# Patient Record
Sex: MALE | Race: WHITE | NOT HISPANIC OR LATINO | Employment: FULL TIME | URBAN - NONMETROPOLITAN AREA
[De-identification: names, ages, dates, MRNs, and addresses within clinical notes are randomized per-mention and may not be internally consistent; named-entity substitution may affect disease eponyms.]

---

## 2018-01-10 ENCOUNTER — TELEPHONE (OUTPATIENT)
Dept: FAMILY MEDICINE CLINIC | Facility: CLINIC | Age: 23
End: 2018-01-10

## 2018-01-18 ENCOUNTER — TELEPHONE (OUTPATIENT)
Dept: FAMILY MEDICINE CLINIC | Facility: CLINIC | Age: 23
End: 2018-01-18

## 2018-01-18 ENCOUNTER — APPOINTMENT (OUTPATIENT)
Dept: LAB | Facility: HOSPITAL | Age: 23
End: 2018-01-18

## 2018-01-18 ENCOUNTER — OFFICE VISIT (OUTPATIENT)
Dept: FAMILY MEDICINE CLINIC | Facility: CLINIC | Age: 23
End: 2018-01-18

## 2018-01-18 VITALS
BODY MASS INDEX: 31.55 KG/M2 | DIASTOLIC BLOOD PRESSURE: 80 MMHG | SYSTOLIC BLOOD PRESSURE: 120 MMHG | WEIGHT: 213 LBS | HEIGHT: 69 IN

## 2018-01-18 DIAGNOSIS — F41.9 ANXIETY: ICD-10-CM

## 2018-01-18 DIAGNOSIS — F32.89 OTHER DEPRESSION: Primary | ICD-10-CM

## 2018-01-18 DIAGNOSIS — Z30.09 STERILIZATION CONSULT: ICD-10-CM

## 2018-01-18 PROBLEM — F32.A DEPRESSION: Status: ACTIVE | Noted: 2018-01-18

## 2018-01-18 LAB
25(OH)D3 SERPL-MCNC: 24.3 NG/ML (ref 30–100)
ALBUMIN SERPL-MCNC: 5 G/DL (ref 3.4–4.8)
ALBUMIN/GLOB SERPL: 1.3 G/DL (ref 1.1–1.8)
ALP SERPL-CCNC: 88 U/L (ref 38–126)
ALT SERPL W P-5'-P-CCNC: 41 U/L (ref 21–72)
ANION GAP SERPL CALCULATED.3IONS-SCNC: 14 MMOL/L (ref 5–15)
AST SERPL-CCNC: 66 U/L (ref 17–59)
BASOPHILS # BLD AUTO: 0.06 10*3/MM3 (ref 0–0.2)
BASOPHILS NFR BLD AUTO: 0.6 % (ref 0–2)
BILIRUB SERPL-MCNC: 0.7 MG/DL (ref 0.2–1.3)
BUN BLD-MCNC: 18 MG/DL (ref 7–21)
BUN/CREAT SERPL: 20.2 (ref 7–25)
CALCIUM SPEC-SCNC: 10.2 MG/DL (ref 8.4–10.2)
CHLORIDE SERPL-SCNC: 98 MMOL/L (ref 95–110)
CO2 SERPL-SCNC: 26 MMOL/L (ref 22–31)
CREAT BLD-MCNC: 0.89 MG/DL (ref 0.7–1.3)
DEPRECATED RDW RBC AUTO: 40.7 FL (ref 35.1–43.9)
EOSINOPHIL # BLD AUTO: 0.5 10*3/MM3 (ref 0–0.7)
EOSINOPHIL NFR BLD AUTO: 5.2 % (ref 0–7)
ERYTHROCYTE [DISTWIDTH] IN BLOOD BY AUTOMATED COUNT: 13.2 % (ref 11.5–14.5)
GFR SERPL CREATININE-BSD FRML MDRD: 105 ML/MIN/1.73 (ref 77–179)
GLOBULIN UR ELPH-MCNC: 3.8 GM/DL (ref 2.3–3.5)
GLUCOSE BLD-MCNC: 90 MG/DL (ref 60–100)
HCT VFR BLD AUTO: 44.8 % (ref 39–49)
HGB BLD-MCNC: 15.2 G/DL (ref 13.7–17.3)
IMM GRANULOCYTES # BLD: 0.02 10*3/MM3 (ref 0–0.02)
IMM GRANULOCYTES NFR BLD: 0.2 % (ref 0–0.5)
LYMPHOCYTES # BLD AUTO: 3.28 10*3/MM3 (ref 0.6–4.2)
LYMPHOCYTES NFR BLD AUTO: 34.3 % (ref 10–50)
MCH RBC QN AUTO: 28.8 PG (ref 26.5–34)
MCHC RBC AUTO-ENTMCNC: 33.9 G/DL (ref 31.5–36.3)
MCV RBC AUTO: 84.8 FL (ref 80–98)
MONOCYTES # BLD AUTO: 0.97 10*3/MM3 (ref 0–0.9)
MONOCYTES NFR BLD AUTO: 10.1 % (ref 0–12)
NEUTROPHILS # BLD AUTO: 4.73 10*3/MM3 (ref 2–8.6)
NEUTROPHILS NFR BLD AUTO: 49.6 % (ref 37–80)
PLATELET # BLD AUTO: 316 10*3/MM3 (ref 150–450)
PMV BLD AUTO: 11 FL (ref 8–12)
POTASSIUM BLD-SCNC: 3.7 MMOL/L (ref 3.5–5.1)
PROT SERPL-MCNC: 8.8 G/DL (ref 6.3–8.6)
RBC # BLD AUTO: 5.28 10*6/MM3 (ref 4.37–5.74)
SODIUM BLD-SCNC: 138 MMOL/L (ref 137–145)
TSH SERPL DL<=0.05 MIU/L-ACNC: 2.87 MIU/ML (ref 0.46–4.68)
VIT B12 BLD-MCNC: 698 PG/ML (ref 239–931)
WBC NRBC COR # BLD: 9.56 10*3/MM3 (ref 3.2–9.8)

## 2018-01-18 PROCEDURE — 99213 OFFICE O/P EST LOW 20 MIN: CPT | Performed by: NURSE PRACTITIONER

## 2018-01-18 PROCEDURE — 85025 COMPLETE CBC W/AUTO DIFF WBC: CPT | Performed by: NURSE PRACTITIONER

## 2018-01-18 PROCEDURE — 80053 COMPREHEN METABOLIC PANEL: CPT | Performed by: NURSE PRACTITIONER

## 2018-01-18 PROCEDURE — 36415 COLL VENOUS BLD VENIPUNCTURE: CPT | Performed by: NURSE PRACTITIONER

## 2018-01-18 PROCEDURE — 82306 VITAMIN D 25 HYDROXY: CPT | Performed by: NURSE PRACTITIONER

## 2018-01-18 PROCEDURE — 82607 VITAMIN B-12: CPT | Performed by: NURSE PRACTITIONER

## 2018-01-18 PROCEDURE — 84443 ASSAY THYROID STIM HORMONE: CPT | Performed by: NURSE PRACTITIONER

## 2018-01-18 RX ORDER — BUSPIRONE HYDROCHLORIDE 10 MG/1
10 TABLET ORAL 3 TIMES DAILY
Qty: 90 TABLET | Refills: 3 | Status: SHIPPED | OUTPATIENT
Start: 2018-01-18 | End: 2018-03-02

## 2018-01-18 RX ORDER — ERGOCALCIFEROL 1.25 MG/1
50000 CAPSULE ORAL WEEKLY
Qty: 4 CAPSULE | Refills: 5 | Status: SHIPPED | OUTPATIENT
Start: 2018-01-18 | End: 2018-04-27

## 2018-01-18 NOTE — PROGRESS NOTES
Chief Complaint   Patient presents with   • Establish Care     having some anger issues    • Anxiety     Subjective   Stef Kiran is a 22 y.o. male.     Anxiety   Presents for follow-up visit. Symptoms include chest pain, excessive worry, insomnia, irritability, nervous/anxious behavior and panic. Patient reports no compulsions, confusion, decreased concentration, depressed mood, dizziness, dry mouth, feeling of choking, hyperventilation, impotence, malaise, muscle tension, nausea, obsessions, palpitations, restlessness, shortness of breath or suicidal ideas. Symptoms occur most days. The severity of symptoms is moderate. The quality of sleep is good. Nighttime awakenings: none.     Compliance with medications is %.        The following portions of the patient's history were reviewed and updated as appropriate: allergies, current medications, past social   and problem list.    Review of Systems   Constitutional: Positive for irritability.   HENT: Negative.  Negative for congestion and dental problem.    Eyes: Negative.  Negative for discharge.   Respiratory: Negative.  Negative for shortness of breath, wheezing and stridor.    Cardiovascular: Positive for chest pain. Negative for palpitations.   Gastrointestinal: Negative.  Negative for nausea.   Endocrine: Negative.  Negative for cold intolerance, heat intolerance, polydipsia, polyphagia and polyuria.   Genitourinary: Negative.  Negative for impotence.        Requesting sterilization due to having 4 children    Musculoskeletal: Negative.  Negative for arthralgias, back pain and gait problem.   Skin: Negative.    Allergic/Immunologic: Negative.  Negative for environmental allergies, food allergies and immunocompromised state.   Neurological: Negative.  Negative for dizziness.   Hematological: Negative.  Negative for adenopathy. Does not bruise/bleed easily.   Psychiatric/Behavioral: Positive for agitation and sleep disturbance. Negative for behavioral  "problems, confusion, decreased concentration, dysphoric mood, hallucinations and suicidal ideas. The patient is nervous/anxious and has insomnia. The patient is not hyperactive.         Stress anxiety -hx of same-self medicated in the past        Objective   /80  Ht 175.3 cm (69\")  Wt 96.6 kg (213 lb)  BMI 31.45 kg/m2  Physical Exam   Constitutional: He is oriented to person, place, and time. He appears well-developed and well-nourished. No distress.   HENT:   Head: Normocephalic and atraumatic.   Mouth/Throat: No oropharyngeal exudate.   Eyes: EOM are normal. Pupils are equal, round, and reactive to light. Right eye exhibits no discharge. Left eye exhibits no discharge. No scleral icterus.   Neck: Normal range of motion. Neck supple. No JVD present. No tracheal deviation present. No thyromegaly present.   Cardiovascular: Normal rate, regular rhythm and normal heart sounds.  Exam reveals no gallop and no friction rub.    No murmur heard.  Pulmonary/Chest: Effort normal and breath sounds normal. No stridor. No respiratory distress. He has no wheezes. He has no rales. He exhibits no tenderness.   Abdominal: Soft. Bowel sounds are normal. He exhibits no distension and no mass. There is no tenderness. There is no rebound and no guarding. No hernia.   Genitourinary: Rectum normal and penis normal.   Musculoskeletal: Normal range of motion. He exhibits no edema, tenderness or deformity.   Lymphadenopathy:     He has no cervical adenopathy.   Neurological: He is alert and oriented to person, place, and time. He has normal reflexes. He displays normal reflexes. No cranial nerve deficit. He exhibits normal muscle tone. Coordination normal.   Skin: Skin is warm. No rash noted. He is not diaphoretic. No erythema. No pallor.   Psychiatric: He has a normal mood and affect.   Nursing note and vitals reviewed.      Assessment/Plan   Problem List Items Addressed This Visit        Other    Anxiety    Relevant Orders    CBC " & Differential    Comprehensive Metabolic Panel    TSH    Vitamin D 25 Hydroxy    Vitamin B12    CBC Auto Differential    Depression - Primary    Relevant Medications    busPIRone (BUSPAR) 10 MG tablet    sertraline (ZOLOFT) 50 MG tablet    Other Relevant Orders    CBC & Differential    Comprehensive Metabolic Panel    TSH    Vitamin D 25 Hydroxy    Vitamin B12    CBC Auto Differential    Sterilization consult    Relevant Orders    Ambulatory Referral to Urology (Completed)    CBC & Differential    Comprehensive Metabolic Panel    TSH    Vitamin D 25 Hydroxy    Vitamin B12    CBC Auto Differential           New Medications Ordered This Visit   Medications   • busPIRone (BUSPAR) 10 MG tablet     Sig: Take 1 tablet by mouth 3 (Three) Times a Day.     Dispense:  90 tablet     Refill:  3   • sertraline (ZOLOFT) 50 MG tablet     Sig: Take 1 tablet by mouth Daily.     Dispense:  30 tablet     Refill:  11        Add buspar, zoloft as directed meds as directed-if symptoms worsen return     It's not just what you eat, but when you eat  Eat breakfast, and eat smaller meals throughout the day. A healthy breakfast can jumpstart your metabolism, while eating small, healthy meals (rather than the standard three large meals) keeps your energy up.   Avoid eating at night. Try to eat dinner earlier and fast for 14-16 hours until breakfast the next morning. Studies suggest that eating only when you’re most active and giving your digestive system a long break each day may help to regulate weight.

## 2018-01-18 NOTE — PROGRESS NOTES
TAMMY Hess, Mr. Kiran has been called with his recent lab results & recommendations.  Continue his current medications and follow-up as planned or sooner if any problems.

## 2018-01-18 NOTE — TELEPHONE ENCOUNTER
TAMMY Hess, Mr. Kiran has been called with his recent lab results & recommendations.  Continue his current medications and follow-up as planned or sooner if any problems.      ----- Message from TAMMY Dobbins sent at 1/18/2018  3:32 PM CST -----  Can you let him know his vitamin d is low needs vitamin d [id number] weekly otherwise his labs are good

## 2018-03-02 ENCOUNTER — OFFICE VISIT (OUTPATIENT)
Dept: FAMILY MEDICINE CLINIC | Facility: CLINIC | Age: 23
End: 2018-03-02

## 2018-03-02 VITALS
BODY MASS INDEX: 31.84 KG/M2 | WEIGHT: 215 LBS | SYSTOLIC BLOOD PRESSURE: 110 MMHG | HEIGHT: 69 IN | DIASTOLIC BLOOD PRESSURE: 72 MMHG

## 2018-03-02 DIAGNOSIS — F41.9 ANXIETY: Primary | ICD-10-CM

## 2018-03-02 DIAGNOSIS — F32.89 OTHER DEPRESSION: ICD-10-CM

## 2018-03-02 PROCEDURE — 99213 OFFICE O/P EST LOW 20 MIN: CPT | Performed by: NURSE PRACTITIONER

## 2018-03-02 RX ORDER — BUSPIRONE HYDROCHLORIDE 30 MG/1
TABLET ORAL
Qty: 80 TABLET | Refills: 11 | Status: SHIPPED | OUTPATIENT
Start: 2018-03-02

## 2018-03-02 NOTE — PROGRESS NOTES
Chief Complaint   Patient presents with   • Follow-up   • Depression     change the dose     Subjective   Stef Kiran is a 23 y.o. male.     Anxiety   Presents for follow-up visit. Symptoms include chest pain, excessive worry, insomnia, irritability, nervous/anxious behavior and panic. Patient reports no compulsions, confusion, decreased concentration, depressed mood, dizziness, dry mouth, feeling of choking, hyperventilation, impotence, malaise, muscle tension, nausea, obsessions, palpitations, restlessness, shortness of breath or suicidal ideas. Symptoms occur most days. The severity of symptoms is moderate. The quality of sleep is good. Nighttime awakenings: none.     Compliance with medications is %.        The following portions of the patient's history were reviewed and updated as appropriate: allergies, current medications, past social history and problem list.    Review of Systems   Constitutional: Positive for irritability.   HENT: Negative.  Negative for congestion and dental problem.    Eyes: Negative.  Negative for discharge.   Respiratory: Negative.  Negative for shortness of breath, wheezing and stridor.    Cardiovascular: Positive for chest pain. Negative for palpitations.   Gastrointestinal: Negative.  Negative for nausea.   Endocrine: Negative.  Negative for cold intolerance, heat intolerance, polydipsia, polyphagia and polyuria.   Genitourinary: Negative.  Negative for impotence.        Requesting sterilization due to having 4 children    Musculoskeletal: Negative.  Negative for arthralgias, back pain and gait problem.   Skin: Negative.    Allergic/Immunologic: Negative.  Negative for environmental allergies, food allergies and immunocompromised state.   Neurological: Negative.  Negative for dizziness.   Hematological: Negative.  Negative for adenopathy. Does not bruise/bleed easily.   Psychiatric/Behavioral: Positive for agitation and sleep disturbance. Negative for behavioral problems,  "confusion, decreased concentration, dysphoric mood, hallucinations and suicidal ideas. The patient is nervous/anxious and has insomnia. The patient is not hyperactive.         Stress anxiety -hx of same-self medicated in the past -meds helping but wants to increasing due to family stressors and anxiety        Objective   /72  Ht 175.3 cm (69\")  Wt 97.5 kg (215 lb)  BMI 31.75 kg/m2  Physical Exam   Constitutional: He is oriented to person, place, and time. He appears well-developed and well-nourished. No distress.   HENT:   Head: Normocephalic and atraumatic.   Mouth/Throat: No oropharyngeal exudate.   Eyes: EOM are normal. Pupils are equal, round, and reactive to light. Right eye exhibits no discharge. Left eye exhibits no discharge. No scleral icterus.   Neck: Normal range of motion. Neck supple. No JVD present. No tracheal deviation present. No thyromegaly present.   Cardiovascular: Normal rate, regular rhythm and normal heart sounds.  Exam reveals no gallop and no friction rub.    No murmur heard.  Pulmonary/Chest: Effort normal and breath sounds normal. No stridor. No respiratory distress. He has no wheezes. He has no rales. He exhibits no tenderness.   Abdominal: Soft. Bowel sounds are normal. He exhibits no distension and no mass. There is no tenderness. There is no rebound and no guarding. No hernia.   Genitourinary: Rectum normal and penis normal.   Musculoskeletal: Normal range of motion. He exhibits no edema, tenderness or deformity.   Lymphadenopathy:     He has no cervical adenopathy.   Neurological: He is alert and oriented to person, place, and time. He has normal reflexes. He displays normal reflexes. No cranial nerve deficit. He exhibits normal muscle tone. Coordination normal.   Skin: Skin is warm and dry. No rash noted. He is not diaphoretic. No erythema. No pallor.   Psychiatric: He has a normal mood and affect.   Nursing note and vitals reviewed.      Assessment/Plan   Problem List Items " Addressed This Visit        Other    Anxiety - Primary    Depression    Relevant Medications    busPIRone (BUSPAR) 30 MG tablet           New Medications Ordered This Visit   Medications   • busPIRone (BUSPAR) 30 MG tablet     Sig: Use bid and as needed-up to three times a day     Dispense:  80 tablet     Refill:  11       Stress relief discussed in length. Consider therapy, suggest yoga, exercise, meditation -patient is agrees-will call back if worsen for referral

## 2018-03-22 ENCOUNTER — OFFICE VISIT (OUTPATIENT)
Dept: FAMILY MEDICINE CLINIC | Facility: CLINIC | Age: 23
End: 2018-03-22

## 2018-03-22 VITALS
SYSTOLIC BLOOD PRESSURE: 110 MMHG | WEIGHT: 224 LBS | DIASTOLIC BLOOD PRESSURE: 80 MMHG | BODY MASS INDEX: 33.18 KG/M2 | HEIGHT: 69 IN

## 2018-03-22 DIAGNOSIS — Z30.09 ENCOUNTER FOR VASECTOMY ASSESSMENT: ICD-10-CM

## 2018-03-22 DIAGNOSIS — R10.2 PELVIC PAIN: Primary | ICD-10-CM

## 2018-03-22 PROCEDURE — 99213 OFFICE O/P EST LOW 20 MIN: CPT | Performed by: NURSE PRACTITIONER

## 2018-03-22 RX ORDER — DOXYCYCLINE 100 MG/1
100 CAPSULE ORAL 2 TIMES DAILY
Qty: 14 CAPSULE | Refills: 0 | Status: SHIPPED | OUTPATIENT
Start: 2018-03-22 | End: 2018-04-12

## 2018-03-22 RX ORDER — NAPROXEN 500 MG/1
500 TABLET ORAL 2 TIMES DAILY WITH MEALS
Qty: 30 TABLET | Refills: 1 | Status: SHIPPED | OUTPATIENT
Start: 2018-03-22 | End: 2018-04-27

## 2018-03-22 RX ORDER — ACETAMINOPHEN AND CODEINE PHOSPHATE 300; 30 MG/1; MG/1
1 TABLET ORAL EVERY 4 HOURS PRN
Qty: 30 TABLET | Refills: 0 | Status: SHIPPED | OUTPATIENT
Start: 2018-03-22 | End: 2018-04-12

## 2018-03-22 NOTE — PROGRESS NOTES
"  Chief Complaint   Patient presents with   • Follow-up     after surgery ( vasectomy ) Friday 16   • Abdominal Pain     since having the pocedure that did not get finished     Subjective   Stef Kiran is a 23 y.o. male.     Attempted to have a vasectomy -felt pain and did not complete procedure -is scheduled for inpatient vasectomy July 2018       Abdominal Pain   This is a new problem. The onset quality is sudden. The problem occurs intermittently. The problem has been gradually worsening. The pain is at a severity of 10/10. The pain is severe. The quality of the pain is sharp. Pain radiation: pelvic  Pertinent negatives include no anorexia, arthralgias, belching, constipation, diarrhea, dysuria, fever, flatus, frequency, headaches, hematochezia, hematuria, melena, myalgias, nausea, vomiting or weight loss. Exacerbated by: doxycycline         The following portions of the patient's history were reviewed and updated as appropriate: allergies, current medications, past social history and problem list.    Review of Systems   Constitutional: Negative.  Negative for fever and weight loss.   HENT: Negative.    Eyes: Negative.    Respiratory: Negative.    Cardiovascular: Negative.    Gastrointestinal: Positive for abdominal pain. Negative for anorexia, constipation, diarrhea, flatus, hematochezia, melena, nausea and vomiting.   Endocrine: Negative.    Genitourinary: Positive for genital sores, scrotal swelling and testicular pain. Negative for decreased urine volume, discharge, dysuria, enuresis, flank pain, frequency, hematuria, penile pain, penile swelling and urgency.   Musculoskeletal: Negative.  Negative for arthralgias and myalgias.   Skin: Negative.    Allergic/Immunologic: Negative.    Neurological: Negative.  Negative for headaches.   Hematological: Negative.    Psychiatric/Behavioral: Negative.    All other systems reviewed and are negative.      Objective   /80   Ht 175.3 cm (69\")   Wt 102 kg (224 " lb)   BMI 33.08 kg/m²   Physical Exam   Constitutional: He is oriented to person, place, and time. He appears well-developed and well-nourished.   HENT:   Head: Normocephalic and atraumatic.   Eyes: EOM are normal. Pupils are equal, round, and reactive to light.   Neck: Normal range of motion. Neck supple.   Cardiovascular: Normal rate.    Pulmonary/Chest: Effort normal and breath sounds normal.   Abdominal: Soft. Bowel sounds are normal.   Genitourinary:   Genitourinary Comments: Swelling right testicle from previous intervention -stitch present    Musculoskeletal: Normal range of motion.   Neurological: He is alert and oriented to person, place, and time.   Skin: Skin is warm and dry.   Right testicle tender and swollen    Nursing note and vitals reviewed.      Assessment/Plan   Problem List Items Addressed This Visit        Nervous and Auditory    Pelvic pain - Primary       Other    Encounter for vasectomy assessment    Relevant Orders    Ambulatory Referral to Urology (Completed)      Other Visit Diagnoses    None.          New Medications Ordered This Visit   Medications   • doxycycline (MONODOX) 100 MG capsule     Sig: Take 1 capsule by mouth 2 (Two) Times a Day.     Dispense:  14 capsule     Refill:  0   • naproxen (NAPROSYN) 500 MG tablet     Sig: Take 1 tablet by mouth 2 (Two) Times a Day With Meals.     Dispense:  30 tablet     Refill:  1   • acetaminophen-codeine (TYLENOL #3) 300-30 MG per tablet     Sig: Take 1 tablet by mouth Every 4 (Four) Hours As Needed for Moderate Pain .     Dispense:  30 tablet     Refill:  0      warm compresses to area and see if helps, meds as directed, return if worsen     Patient understands the risks associated with this controlled medication, including tolerance and addiction.  he also agrees to only obtain this medication from me, and not from a another provider, unless that provider is covering for me in my absence.  he also agrees to be compliant in dosing, and not  self adjust the dose of medication.  A signed controlled substance agreement is on file, and he has received a controlled substance education sheet at this a previous visit.  he has also signed a consent for treatment with a controlled substance as per [facility] policy. MAXIMO was obtained.

## 2018-03-27 ENCOUNTER — PREP FOR SURGERY (OUTPATIENT)
Dept: OTHER | Facility: HOSPITAL | Age: 23
End: 2018-03-27

## 2018-03-27 DIAGNOSIS — Z30.09 UNWANTED FERTILITY: Primary | ICD-10-CM

## 2018-03-27 RX ORDER — CEFTRIAXONE 1 G/1
1 INJECTION, POWDER, FOR SOLUTION INTRAMUSCULAR; INTRAVENOUS ONCE
Status: CANCELLED | OUTPATIENT
Start: 2018-04-17 | End: 2018-04-17

## 2018-03-28 PROBLEM — Z30.09 UNWANTED FERTILITY: Status: ACTIVE | Noted: 2018-03-28

## 2018-04-02 NOTE — H&P
[facility] History and Physical  FARHAT LOPEZ  23-year-old; :1995;;male  [id number];[place] [id number]  Ins: 1) [employer]  Employer: [employer]  MRN:[id number]  CIERA CLANCY MD  [facility]  2018 11:31 AM  Allergies  No Known Allergies Unknown  Current Medications  Takes no meds  * Medications Reconciled  Surgical History  NONE NOTED  Social History  Pt currently uses one can of dip a day. Does not  drink.   Imm/Inj/Office Meds History Comments  Patient declines influenza vaccination.  Chief Complaint  [place] Male  History of Present Illness  Patient is a  male here today for a vasectomy consultation. He has 4 children, two daughters ages 5 years and 3 years and two sons ages 8 years and 2 years.  Location: Vas deferens  Modifying factors: Birth control of none is used at present time.  Associated signs and symptoms: None.  Review of Systems  Eyes: ; Denies: blurry vision, pain in the eyes and double vision  ENMT: ; Denies: ear pain, sore throat and sinus problems  Cardiovascular: ; Denies: chest pain, varicose veins, angina and syncope  Respiratory: ; Denies: shortness of breath, wheezing and frequent cough  Gastrointestinal: ; Denies: abdominal pain, nausea, vomiting, indigestion and  heartburn  Genitourinary: ; Denies: other symptoms (see HPI)  Musculoskeletal: ; Denies: joint pain, neck pain and back pain  Integumentary: ; Denies: skin rash, boils and persistent itch  Neurological: ; Denies: tremors, dizzy spells and numbness / tingling  Psychiatric: Reports: generally satisfied with life and anxiety; Denies: depression  and suicidal ideation  Endocrine: ; Denies: Excessive thirst, cold intolerance, heat intolerance and  tired/sluggish  Hematologic/Lymphatic: ; Denies: swollen glands and blood clotting problem  Allergic/Immunologic: ; Denies: hayfever  Constitutional: ; Denies: fever, chills and weight loss  Vital  Signs  Heart Rate: 85 (/min) Weight: 215 (lb)  Resp Rate: 16 (/min) Height: 69 (in)  BMI: 31.75 Former smoker  BP: 134/98 (mmHg)  Exam  General appearance: The patient appears well developed and well nourished, in no apparent acute distress.  Examination of neck: Neck appears supple.  Assessment of respiratory effort: Respiratory effort appears normal. No apparent  distress.  Examination of gait and station: Normal gait and stature.  Head and neck (Assessment of range of motion): Adequate ROM noted in all  extremities.  Head and neck (Assessment of stability): Ambulates without assistance.  Inspection and/or palpation of skin and subcutaneous tissue: Exam of the skin is within normal limits.  Orientation: He appears alert and oriented.  Mood and affect: Mood and affect appear normal.  Data Review  Medications and chart reviewed. History and physical form/ROS reviewed and new  form completed today.  Assessment - Vasectomy requested  DX:  Vasectomy requested  SNO: [id number], ICD-9: Z30.2  Assessment Notes:  The patient and I had a long discussion in regard to vasectomy. The patient  understands he is not safe for unprotected intercourse prior to viewing two semen  specimens after the vasectomy showing no sperm. He understands that there is a possibility of recanalization of the vas deferens creating fertility. He should use ice, elevation, and inactivity for two days following the vasectomy; no intercourse for 2 weeks. He understands complication of: 1) swelling in the scrotal area and the testicles, 2) bleeding, possibly requiring hospitalization and surgical drainage, 3) loss of a testicle, 4) infection, and 5) pain. He understands this is meant to be a permanent procedure; however the results can not be guaranteed. His questions have been answered.  Consent has been signed.  Plan  Plan Notes:  Vasectomy.  Education:  Vasectomy - English  Vasectomy Consent - English  Discussion:  Literature and reference material  was given to the patient and discussed.  FINDINGS WERE DISCUSSED WITH PATIENT. RISKS AND BENEFITS  EXPLAINED. PATIENT WISHES TO PROCEED.  Instructions:  The patient is to schedule the procedure.

## 2018-04-12 ENCOUNTER — OFFICE VISIT (OUTPATIENT)
Dept: FAMILY MEDICINE CLINIC | Facility: CLINIC | Age: 23
End: 2018-04-12

## 2018-04-12 VITALS
WEIGHT: 227 LBS | SYSTOLIC BLOOD PRESSURE: 110 MMHG | BODY MASS INDEX: 33.62 KG/M2 | DIASTOLIC BLOOD PRESSURE: 70 MMHG | HEIGHT: 69 IN

## 2018-04-12 DIAGNOSIS — R10.2 PELVIC PAIN: ICD-10-CM

## 2018-04-12 DIAGNOSIS — F41.9 ANXIETY: Primary | ICD-10-CM

## 2018-04-12 PROCEDURE — 99213 OFFICE O/P EST LOW 20 MIN: CPT | Performed by: NURSE PRACTITIONER

## 2018-04-12 RX ORDER — SERTRALINE HYDROCHLORIDE 100 MG/1
100 TABLET, FILM COATED ORAL DAILY
Qty: 30 TABLET | Refills: 11 | Status: SHIPPED | OUTPATIENT
Start: 2018-04-12

## 2018-04-12 RX ORDER — IBUPROFEN 800 MG/1
800 TABLET ORAL EVERY 6 HOURS PRN
Qty: 60 TABLET | Refills: 5 | Status: SHIPPED | OUTPATIENT
Start: 2018-04-12 | End: 2018-04-27

## 2018-04-12 RX ORDER — GABAPENTIN 100 MG/1
100 CAPSULE ORAL 3 TIMES DAILY
Qty: 60 CAPSULE | Refills: 2 | Status: SHIPPED | OUTPATIENT
Start: 2018-04-12 | End: 2018-04-27

## 2018-04-12 NOTE — PROGRESS NOTES
Chief Complaint   Patient presents with   • Follow-up     medication   • Anxiety       Chief Complaint   Patient presents with   • Follow-up     medication   • Anxiety     Subjective   Stef Kiran is a 23 y.o. male.     Anxiety   Presents for follow-up visit. Symptoms include chest pain, excessive worry, insomnia, irritability, nervous/anxious behavior and panic. Patient reports no compulsions, confusion, decreased concentration, depressed mood, dizziness, dry mouth, feeling of choking, hyperventilation, impotence, malaise, muscle tension, nausea, obsessions, palpitations, restlessness, shortness of breath or suicidal ideas. Symptoms occur most days. The severity of symptoms is moderate. The quality of sleep is good. Nighttime awakenings: none.     Compliance with medications is %.        The following portions of the patient's history were reviewed and updated as appropriate: allergies, current medications, past social history and problem list.    Review of Systems   Constitutional: Positive for irritability.   HENT: Negative.  Negative for congestion and dental problem.    Eyes: Negative.  Negative for discharge.   Respiratory: Negative.  Negative for shortness of breath, wheezing and stridor.    Cardiovascular: Positive for chest pain. Negative for palpitations.   Gastrointestinal: Negative.  Negative for nausea.   Endocrine: Negative.  Negative for cold intolerance, heat intolerance, polydipsia, polyphagia and polyuria.   Genitourinary: Negative.  Negative for impotence.        Requesting sterilization due to having 4 children    Musculoskeletal: Negative.  Negative for arthralgias, back pain and gait problem.   Skin: Negative.    Allergic/Immunologic: Negative.  Negative for environmental allergies, food allergies and immunocompromised state.   Neurological: Negative.  Negative for dizziness.   Hematological: Negative.  Negative for adenopathy. Does not bruise/bleed easily.   Psychiatric/Behavioral:  "Positive for agitation and sleep disturbance. Negative for behavioral problems, confusion, decreased concentration, dysphoric mood, hallucinations and suicidal ideas. The patient is nervous/anxious and has insomnia. The patient is not hyperactive.         Stress anxiety -hx of same-self medicated in the past -meds helping but wants to increasing due to family stressors and anxiety        Objective   /70   Ht 175.3 cm (69\")   Wt 103 kg (227 lb)   BMI 33.52 kg/m²   Physical Exam   Constitutional: He is oriented to person, place, and time. He appears well-developed and well-nourished. No distress.   HENT:   Head: Normocephalic and atraumatic.   Mouth/Throat: No oropharyngeal exudate.   Eyes: EOM are normal. Pupils are equal, round, and reactive to light. Right eye exhibits no discharge. Left eye exhibits no discharge. No scleral icterus.   Neck: Normal range of motion. Neck supple. No JVD present. No tracheal deviation present. No thyromegaly present.   Cardiovascular: Normal rate, regular rhythm and normal heart sounds.  Exam reveals no gallop and no friction rub.    No murmur heard.  Pulmonary/Chest: Effort normal and breath sounds normal. No stridor. No respiratory distress. He has no wheezes. He has no rales. He exhibits no tenderness.   Abdominal: Soft. Bowel sounds are normal. He exhibits no distension and no mass. There is no tenderness. There is no rebound and no guarding. No hernia.   Genitourinary: Rectum normal and penis normal.   Musculoskeletal: Normal range of motion. He exhibits no edema, tenderness or deformity.   Lymphadenopathy:     He has no cervical adenopathy.   Neurological: He is alert and oriented to person, place, and time. He has normal reflexes. He displays normal reflexes. No cranial nerve deficit. He exhibits normal muscle tone. Coordination normal.   Skin: Skin is warm and dry. No rash noted. He is not diaphoretic. No erythema. No pallor.   Psychiatric: He has a normal mood and " "affect.   Nursing note and vitals reviewed.      Increase zoloft-neurontin prn pain and anxiety -add ibuprofen for pain-needs fmla for time off of work as directed     Patient has not been able to work this week due to pain-has called in but needs fmla to cover             Follow up with anxiety and recent procedure -attempted sterilization in office-is scheduled for in hospital procedure 04/18/2018 -having pelvic pain and pressure-not able to stand the pain during work-pain is intense     Pelvic Pain   This is a recurrent problem. The current episode started 1 to 4 weeks ago. The problem occurs constantly. The problem has been gradually worsening. Associated symptoms include abdominal pain. Pertinent negatives include no anorexia, arthralgias, chest pain, chills, congestion, coughing, diaphoresis, fever, myalgias, neck pain, numbness, rash, sore throat, swollen glands or urinary symptoms. The symptoms are aggravated by bending, exertion and standing. He has tried acetaminophen for the symptoms.        The following portions of the patient's history were reviewed and updated as appropriate: allergies, current medications, past social history and   Review of Systems   Constitutional: Negative for chills, diaphoresis and fever.   HENT: Negative for congestion and sore throat.    Respiratory: Negative for cough.    Cardiovascular: Negative for chest pain.   Gastrointestinal: Positive for abdominal pain. Negative for anorexia.   Genitourinary: Positive for genital sores and pelvic pain. Negative for discharge and hematuria.   Musculoskeletal: Negative for arthralgias, myalgias and neck pain.   Skin: Negative for rash.   Neurological: Negative for numbness.       Objective   /70   Ht 175.3 cm (69\")   Wt 103 kg (227 lb)   BMI 33.52 kg/m²   Physical Exam    Patient understands the risks associated with this controlled medication, including tolerance and addiction.  he also agrees to only obtain this medication " from me, and not from a another provider, unless that provider is covering for me in my absence.  he also agrees to be compliant in dosing, and not self adjust the dose of medication.  A signed controlled substance agreement is on file, and he has received a controlled substance education sheet at this a previous visit.  he has also signed a consent for treatment with a controlled substance as per [facility] policy. MAXIMO was obtained.

## 2018-04-13 ENCOUNTER — APPOINTMENT (OUTPATIENT)
Dept: PREADMISSION TESTING | Facility: HOSPITAL | Age: 23
End: 2018-04-13

## 2018-04-13 VITALS
OXYGEN SATURATION: 97 % | HEIGHT: 69 IN | BODY MASS INDEX: 33.18 KG/M2 | WEIGHT: 224 LBS | SYSTOLIC BLOOD PRESSURE: 120 MMHG | RESPIRATION RATE: 16 BRPM | HEART RATE: 70 BPM | DIASTOLIC BLOOD PRESSURE: 80 MMHG

## 2018-04-13 RX ORDER — SODIUM CHLORIDE, SODIUM GLUCONATE, SODIUM ACETATE, POTASSIUM CHLORIDE, AND MAGNESIUM CHLORIDE 526; 502; 368; 37; 30 MG/100ML; MG/100ML; MG/100ML; MG/100ML; MG/100ML
1000 INJECTION, SOLUTION INTRAVENOUS CONTINUOUS PRN
Status: CANCELLED | OUTPATIENT
Start: 2018-04-18

## 2018-04-18 ENCOUNTER — ANESTHESIA EVENT (OUTPATIENT)
Dept: PERIOP | Facility: HOSPITAL | Age: 23
End: 2018-04-18

## 2018-04-18 ENCOUNTER — ANESTHESIA (OUTPATIENT)
Dept: PERIOP | Facility: HOSPITAL | Age: 23
End: 2018-04-18

## 2018-04-18 ENCOUNTER — HOSPITAL ENCOUNTER (OUTPATIENT)
Facility: HOSPITAL | Age: 23
Setting detail: HOSPITAL OUTPATIENT SURGERY
Discharge: HOME | End: 2018-04-18
Attending: UROLOGY | Admitting: UROLOGY

## 2018-04-18 VITALS
HEART RATE: 76 BPM | DIASTOLIC BLOOD PRESSURE: 55 MMHG | WEIGHT: 222.66 LBS | RESPIRATION RATE: 18 BRPM | TEMPERATURE: 97 F | SYSTOLIC BLOOD PRESSURE: 103 MMHG | HEIGHT: 69 IN | OXYGEN SATURATION: 94 % | BODY MASS INDEX: 32.98 KG/M2

## 2018-04-18 DIAGNOSIS — Z30.09 UNWANTED FERTILITY: ICD-10-CM

## 2018-04-18 PROCEDURE — 25010000002 ONDANSETRON PER 1 MG: Performed by: NURSE ANESTHETIST, CERTIFIED REGISTERED

## 2018-04-18 PROCEDURE — 25010000002 DEXAMETHASONE PER 1 MG: Performed by: NURSE ANESTHETIST, CERTIFIED REGISTERED

## 2018-04-18 PROCEDURE — 25010000002 PROPOFOL 10 MG/ML EMULSION: Performed by: NURSE ANESTHETIST, CERTIFIED REGISTERED

## 2018-04-18 PROCEDURE — 88302 TISSUE EXAM BY PATHOLOGIST: CPT | Performed by: PATHOLOGY

## 2018-04-18 PROCEDURE — 25010000002 FENTANYL CITRATE (PF) 100 MCG/2ML SOLUTION: Performed by: NURSE ANESTHETIST, CERTIFIED REGISTERED

## 2018-04-18 PROCEDURE — 88302 TISSUE EXAM BY PATHOLOGIST: CPT | Performed by: UROLOGY

## 2018-04-18 PROCEDURE — 25010000002 MIDAZOLAM PER 1 MG: Performed by: NURSE ANESTHETIST, CERTIFIED REGISTERED

## 2018-04-18 RX ORDER — NALOXONE HCL 0.4 MG/ML
0.2 VIAL (ML) INJECTION AS NEEDED
Status: DISCONTINUED | OUTPATIENT
Start: 2018-04-18 | End: 2018-04-18 | Stop reason: HOSPADM

## 2018-04-18 RX ORDER — LIDOCAINE HYDROCHLORIDE 10 MG/ML
INJECTION, SOLUTION EPIDURAL; INFILTRATION; INTRACAUDAL; PERINEURAL AS NEEDED
Status: DISCONTINUED | OUTPATIENT
Start: 2018-04-18 | End: 2018-04-18 | Stop reason: HOSPADM

## 2018-04-18 RX ORDER — MIDAZOLAM HYDROCHLORIDE 1 MG/ML
INJECTION INTRAMUSCULAR; INTRAVENOUS AS NEEDED
Status: DISCONTINUED | OUTPATIENT
Start: 2018-04-18 | End: 2018-04-18 | Stop reason: SURG

## 2018-04-18 RX ORDER — DIPHENHYDRAMINE HYDROCHLORIDE 50 MG/ML
12.5 INJECTION INTRAMUSCULAR; INTRAVENOUS
Status: DISCONTINUED | OUTPATIENT
Start: 2018-04-18 | End: 2018-04-18 | Stop reason: HOSPADM

## 2018-04-18 RX ORDER — DOXYCYCLINE HYCLATE 100 MG/1
100 CAPSULE ORAL DAILY
Qty: 7 CAPSULE | Refills: 0 | Status: SHIPPED | OUTPATIENT
Start: 2018-04-18 | End: 2018-04-25

## 2018-04-18 RX ORDER — FLUMAZENIL 0.1 MG/ML
0.2 INJECTION INTRAVENOUS AS NEEDED
Status: DISCONTINUED | OUTPATIENT
Start: 2018-04-18 | End: 2018-04-18 | Stop reason: HOSPADM

## 2018-04-18 RX ORDER — SODIUM CHLORIDE, SODIUM GLUCONATE, SODIUM ACETATE, POTASSIUM CHLORIDE, AND MAGNESIUM CHLORIDE 526; 502; 368; 37; 30 MG/100ML; MG/100ML; MG/100ML; MG/100ML; MG/100ML
1000 INJECTION, SOLUTION INTRAVENOUS CONTINUOUS PRN
Status: DISCONTINUED | OUTPATIENT
Start: 2018-04-18 | End: 2018-04-18 | Stop reason: HOSPADM

## 2018-04-18 RX ORDER — ONDANSETRON 2 MG/ML
4 INJECTION INTRAMUSCULAR; INTRAVENOUS ONCE AS NEEDED
Status: DISCONTINUED | OUTPATIENT
Start: 2018-04-18 | End: 2018-04-18 | Stop reason: HOSPADM

## 2018-04-18 RX ORDER — ONDANSETRON 2 MG/ML
INJECTION INTRAMUSCULAR; INTRAVENOUS AS NEEDED
Status: DISCONTINUED | OUTPATIENT
Start: 2018-04-18 | End: 2018-04-18 | Stop reason: SURG

## 2018-04-18 RX ORDER — MEPERIDINE HYDROCHLORIDE 50 MG/ML
12.5 INJECTION INTRAMUSCULAR; INTRAVENOUS; SUBCUTANEOUS
Status: DISCONTINUED | OUTPATIENT
Start: 2018-04-18 | End: 2018-04-18 | Stop reason: HOSPADM

## 2018-04-18 RX ORDER — ACETAMINOPHEN 325 MG/1
650 TABLET ORAL ONCE AS NEEDED
Status: DISCONTINUED | OUTPATIENT
Start: 2018-04-18 | End: 2018-04-18 | Stop reason: HOSPADM

## 2018-04-18 RX ORDER — PROPOFOL 10 MG/ML
VIAL (ML) INTRAVENOUS AS NEEDED
Status: DISCONTINUED | OUTPATIENT
Start: 2018-04-18 | End: 2018-04-18 | Stop reason: SURG

## 2018-04-18 RX ORDER — EPHEDRINE SULFATE 50 MG/ML
5 INJECTION, SOLUTION INTRAVENOUS ONCE AS NEEDED
Status: DISCONTINUED | OUTPATIENT
Start: 2018-04-18 | End: 2018-04-18 | Stop reason: HOSPADM

## 2018-04-18 RX ORDER — CEFTRIAXONE 1 G/1
1 INJECTION, POWDER, FOR SOLUTION INTRAMUSCULAR; INTRAVENOUS ONCE
Status: DISPENSED | OUTPATIENT
Start: 2018-04-18

## 2018-04-18 RX ORDER — DEXAMETHASONE SODIUM PHOSPHATE 4 MG/ML
INJECTION, SOLUTION INTRA-ARTICULAR; INTRALESIONAL; INTRAMUSCULAR; INTRAVENOUS; SOFT TISSUE AS NEEDED
Status: DISCONTINUED | OUTPATIENT
Start: 2018-04-18 | End: 2018-04-18 | Stop reason: SURG

## 2018-04-18 RX ORDER — ACETAMINOPHEN 650 MG/1
650 SUPPOSITORY RECTAL ONCE AS NEEDED
Status: DISCONTINUED | OUTPATIENT
Start: 2018-04-18 | End: 2018-04-18 | Stop reason: HOSPADM

## 2018-04-18 RX ORDER — FENTANYL CITRATE 50 UG/ML
INJECTION, SOLUTION INTRAMUSCULAR; INTRAVENOUS AS NEEDED
Status: DISCONTINUED | OUTPATIENT
Start: 2018-04-18 | End: 2018-04-18 | Stop reason: SURG

## 2018-04-18 RX ORDER — LABETALOL HYDROCHLORIDE 5 MG/ML
5 INJECTION, SOLUTION INTRAVENOUS
Status: DISCONTINUED | OUTPATIENT
Start: 2018-04-18 | End: 2018-04-18 | Stop reason: HOSPADM

## 2018-04-18 RX ADMIN — PROPOFOL 50 MG: 10 INJECTION, EMULSION INTRAVENOUS at 17:26

## 2018-04-18 RX ADMIN — PROPOFOL 100 MG: 10 INJECTION, EMULSION INTRAVENOUS at 17:24

## 2018-04-18 RX ADMIN — MIDAZOLAM 2 MG: 1 INJECTION INTRAMUSCULAR; INTRAVENOUS at 17:15

## 2018-04-18 RX ADMIN — PROPOFOL 30 MG: 10 INJECTION, EMULSION INTRAVENOUS at 17:34

## 2018-04-18 RX ADMIN — PROPOFOL 30 MG: 10 INJECTION, EMULSION INTRAVENOUS at 17:32

## 2018-04-18 RX ADMIN — PROPOFOL 30 MG: 10 INJECTION, EMULSION INTRAVENOUS at 17:35

## 2018-04-18 RX ADMIN — PROPOFOL 30 MG: 10 INJECTION, EMULSION INTRAVENOUS at 17:36

## 2018-04-18 RX ADMIN — ONDANSETRON 4 MG: 2 INJECTION INTRAMUSCULAR; INTRAVENOUS at 17:45

## 2018-04-18 RX ADMIN — PROPOFOL 30 MG: 10 INJECTION, EMULSION INTRAVENOUS at 17:27

## 2018-04-18 RX ADMIN — FENTANYL CITRATE 25 MCG: 50 INJECTION, SOLUTION INTRAMUSCULAR; INTRAVENOUS at 17:30

## 2018-04-18 RX ADMIN — PROPOFOL 30 MG: 10 INJECTION, EMULSION INTRAVENOUS at 17:30

## 2018-04-18 RX ADMIN — DEXAMETHASONE SODIUM PHOSPHATE 4 MG: 4 INJECTION, SOLUTION INTRAMUSCULAR; INTRAVENOUS at 17:45

## 2018-04-18 RX ADMIN — FENTANYL CITRATE 50 MCG: 50 INJECTION, SOLUTION INTRAMUSCULAR; INTRAVENOUS at 17:24

## 2018-04-18 RX ADMIN — SODIUM CHLORIDE, SODIUM GLUCONATE, SODIUM ACETATE, POTASSIUM CHLORIDE, AND MAGNESIUM CHLORIDE 1000 ML: 526; 502; 368; 37; 30 INJECTION, SOLUTION INTRAVENOUS at 15:14

## 2018-04-18 RX ADMIN — PROPOFOL 30 MG: 10 INJECTION, EMULSION INTRAVENOUS at 17:28

## 2018-04-18 NOTE — ADDENDUM NOTE
Addendum  created 04/18/18 1844 by Colby Joshi MD    Anesthesia Attestations filed, Anesthesia Staff edited

## 2018-04-18 NOTE — OP NOTE
VASECTOMY  Procedure Note    Stef Kiran  4/18/2018    Pre-op Diagnosis:   Unwanted fertility [Z30.09]    Post-op Diagnosis:     Post-Op Diagnosis Codes:     * Unwanted fertility [Z30.09]      Procedure(s):  VASECTOMY    Surgeon(s):  Aaron Torres MD    Anesthesia: General    Staff:   Circulator: Ju Sol RN; Destiney Castillo RN  Scrub Person: Rosa Hunter V  Assistant: Kalpana Anthony CSA    Estimated Blood Loss: minimal    Specimens:                None      Drains:      Findings: Intact bilateral vas    Complications: None    Indications: Fertility desire for sterilization unable to do in the office under local    Description of Procedure: Patient brought operative suite placed in supine position.  Sterile prep and drape.  I isolated the vas and the right left inside and infiltrated 1% Xylocaine plain.  Made an incision over the vas isolated with the Allis clamps.  Right and left side.  Made an incision under the vasa the right left inside and placed a hemostat under this and then clamped of the vas proximally and distally.  We divided the vas between this hemostats.  Tied off the proximal distal ends on the right left inside with 2-0 Vicryl and used 2-0 Ethibond proximal distal left.  Cauterized inside the lumen of the vas partial distally right left.  Destroying the lumen of the vas back in the scrotum with a interrupted figure-of-eight 2-0 Vicryl right left.  Fluff dressing scrotal support was applied    Aaron Torres MD     Date: 4/18/2018  Time: 5:54 PM

## 2018-04-18 NOTE — ANESTHESIA PROCEDURE NOTES
Airway  Urgency: elective    Airway not difficult    General Information and Staff    Patient location during procedure: OR  CRNA: SOCO GOLDBERG    Indications and Patient Condition  Indications for airway management: airway protection    Preoxygenated: yes  Mask difficulty assessment: 0 - not attempted    Final Airway Details  Final airway type: supraglottic airway      Successful airway: classic  Size 4    Number of attempts at approach: 1

## 2018-04-18 NOTE — ANESTHESIA POSTPROCEDURE EVALUATION
Patient: Stef Kiran    Procedure Summary     Date:  04/18/18 Room / Location:  [facility] OR 09 / [facility] OR    Anesthesia Start:  1717 Anesthesia Stop:  1803    Procedure:  VASECTOMY (N/A Scrotum) Diagnosis:       Unwanted fertility      (Unwanted fertility [Z30.09])    Surgeon:  Aaron Torres MD Provider:  Yoly Roman CRNA    Anesthesia Type:  general ASA Status:  2          Anesthesia Type: general  Last vitals  BP   118/74 (04/18/18 1451)   Temp   98.5 °F (36.9 °C) (04/18/18 1451)   Pulse   67 (04/18/18 1451)   Resp   18 (04/18/18 1451)     SpO2   96 % (04/18/18 1451)     Post Anesthesia Care and Evaluation    Patient location during evaluation: PACU  Patient participation: complete - patient participated  Level of consciousness: awake and alert  Pain score: 0  Airway patency: patent  Anesthetic complications: No anesthetic complications    Cardiovascular status: acceptable  Respiratory status: acceptable (LMA in place)  Hydration status: acceptable

## 2018-04-18 NOTE — ANESTHESIA PREPROCEDURE EVALUATION
Anesthesia Evaluation     NPO Solid Status: > 8 hours  NPO Liquid Status: > 8 hours           Airway   Mallampati: II  TM distance: <3 FB  Neck ROM: full  Possible difficult intubation, Small opening and Anterior  Comment: Large beard may make mask ventilation challenging.  Dental - normal exam     Pulmonary     breath sounds clear to auscultation  (+) a smoker Former,   Cardiovascular     Rhythm: regular  Rate: normal        Neuro/Psych  (+) syncope, psychiatric history Anxiety,     GI/Hepatic/Renal/Endo    (+) obesity,  GERD well controlled,      Musculoskeletal     Abdominal     Abdomen: soft.   Substance History      OB/GYN          Other                        Anesthesia Plan    ASA 2     general     intravenous induction   Anesthetic plan and risks discussed with patient.

## 2018-04-20 LAB
LAB AP CASE REPORT: NORMAL
LAB AP CLINICAL INFORMATION: NORMAL
Lab: NORMAL
PATH REPORT.FINAL DX SPEC: NORMAL
PATH REPORT.GROSS SPEC: NORMAL

## 2018-04-27 ENCOUNTER — OFFICE VISIT (OUTPATIENT)
Dept: FAMILY MEDICINE CLINIC | Facility: CLINIC | Age: 23
End: 2018-04-27

## 2018-04-27 VITALS
BODY MASS INDEX: 33.33 KG/M2 | WEIGHT: 225 LBS | DIASTOLIC BLOOD PRESSURE: 72 MMHG | SYSTOLIC BLOOD PRESSURE: 102 MMHG | HEIGHT: 69 IN

## 2018-04-27 DIAGNOSIS — Z09 SURGICAL FOLLOW-UP CARE: Primary | ICD-10-CM

## 2018-04-27 PROCEDURE — 99213 OFFICE O/P EST LOW 20 MIN: CPT | Performed by: NURSE PRACTITIONER

## 2018-04-27 NOTE — PROGRESS NOTES
"  Chief Complaint   Patient presents with   • Follow-up     after vasectomy on Wed Subjective   Stef Kiran is a 23 y.o. male.     Presents with follow up after vasectomy -patient presents doing well at this time.          The following portions of the patient's history were reviewed and updated as appropriate: allergies, current medications, past social history and problem list.    Review of Systems   Constitutional: Negative for chills, diaphoresis and fever.   HENT: Negative.  Negative for congestion and sore throat.    Eyes: Negative.    Respiratory: Negative.  Negative for cough.    Cardiovascular: Negative.  Negative for chest pain.   Gastrointestinal: Negative.  Negative for abdominal pain.   Endocrine: Negative.    Genitourinary: Negative.  Negative for discharge, genital sores and hematuria.        Left pelvic pain and pressure    Musculoskeletal: Negative for arthralgias, myalgias and neck pain.   Skin: Negative.  Negative for rash.   Allergic/Immunologic: Negative.  Negative for environmental allergies, food allergies and immunocompromised state.   Neurological: Negative.  Negative for numbness.   Hematological: Negative.    Psychiatric/Behavioral: Negative.        Objective   /72   Ht 175.3 cm (69\")   Wt 102 kg (225 lb)   BMI 33.23 kg/m²   Physical Exam   Constitutional: He appears well-developed and well-nourished.   HENT:   Head: Normocephalic and atraumatic.   Eyes: EOM are normal. Pupils are equal, round, and reactive to light.   Neck: Normal range of motion.   Cardiovascular: Normal rate.    Pulmonary/Chest: Effort normal and breath sounds normal.   Abdominal: Soft. Bowel sounds are normal.   Musculoskeletal: Normal range of motion.   Neurological: He is alert.   Skin:   Mild red area to left testicle    Nursing note and vitals reviewed.      Assessment/Plan   Problem List Items Addressed This Visit        Other    Surgical follow-up care - Primary      Other Visit Diagnoses  "   None.        No orders of the defined types were placed in this encounter.    Ice to area, no changes as directed

## 2018-12-06 ENCOUNTER — TELEPHONE (OUTPATIENT)
Dept: FAMILY MEDICINE CLINIC | Facility: CLINIC | Age: 23
End: 2018-12-06

## 2018-12-06 NOTE — TELEPHONE ENCOUNTER
Caterina wanted to let you know that Stef was at their facility 5 day a week rehab outpatient care.     [phone]

## (undated) DEVICE — INTENDED TO BE USED TO OCCLUDE, RETRACT AND IDENTIFY ARTERIES, VEINS, TENDONS AND NERVES IN SURGICAL PROCEDURES: Brand: STERION®  VESSEL LOOP

## (undated) DEVICE — SOL IRR NACL 0.9PCT BT 1000ML

## (undated) DEVICE — ATHLETIC SUPPORTER LATEX FREE, XLARGE

## (undated) DEVICE — SOL PREP POVIDONE IODINE

## (undated) DEVICE — SKIN AFFIX SURG ADHESIVE 72/CS 0.55ML: Brand: MEDLINE

## (undated) DEVICE — GLV SURG SENSICARE GREEN W/ALOE PF LF 7 STRL

## (undated) DEVICE — GLV SURG NEOLON 2G PF LF 8 STRL

## (undated) DEVICE — GAUZE,SPONGE,FLUFF,6"X6.75",STRL,5/TRAY: Brand: MEDLINE

## (undated) DEVICE — GLV SURG NEOLON 2G PF LF 6.5 STRL

## (undated) DEVICE — SUT VICRYL 2-0  X-1 27IN ETVCP459H PLS

## (undated) DEVICE — SUT CARD 2/0 30IN ETX833H

## (undated) DEVICE — SOL SCRB POVIDONE IODINE

## (undated) DEVICE — SPNG GZ WOVN 4X4IN 12PLY 10/BX STRL

## (undated) DEVICE — INTENDED FOR TISSUE SEPARATION, AND OTHER PROCEDURES THAT REQUIRE A SHARP SURGICAL BLADE TO PUNCTURE OR CUT.: Brand: BARD-PARKER ® CARBON RIB-BACK BLADES

## (undated) DEVICE — CONMED ACCESSORY ELECTRODE, NEEDLE ELECTRODE